# Patient Record
Sex: FEMALE | Race: WHITE | NOT HISPANIC OR LATINO | Employment: OTHER | URBAN - METROPOLITAN AREA
[De-identification: names, ages, dates, MRNs, and addresses within clinical notes are randomized per-mention and may not be internally consistent; named-entity substitution may affect disease eponyms.]

---

## 2017-06-13 NOTE — PATIENT DISCUSSION
06/13/2017[id number]/20&nbsp;SN &nbsp; &nbsp; arh

## 2017-06-13 NOTE — PATIENT DISCUSSION
DRY EYES : Discussed with patient the importance of keeping the eye moist and the symptoms associated with dry eyes including blurry vision, tearing, burning, and prashanth sensation. Advised patient to minimize use of any fans blowing directly on the face. Advised patient to continue with artificial tears 2-3 times daily.

## 2017-06-13 NOTE — PATIENT DISCUSSION
Stopped Today: Pred Forte (prednisolone acetate): drops,suspension: 1% 1 drop four times a day into both eyes 05-

## 2018-05-09 ENCOUNTER — ESTABLISHED COMPREHENSIVE EXAM (OUTPATIENT)
Dept: URBAN - METROPOLITAN AREA CLINIC 39 | Facility: CLINIC | Age: 73
End: 2018-05-09

## 2018-05-09 DIAGNOSIS — H43.813: ICD-10-CM

## 2018-05-09 DIAGNOSIS — H25.813: ICD-10-CM

## 2018-05-09 DIAGNOSIS — H10.45: ICD-10-CM

## 2018-05-09 DIAGNOSIS — H04.123: ICD-10-CM

## 2018-05-09 PROCEDURE — 92015 DETERMINE REFRACTIVE STATE: CPT

## 2018-05-09 PROCEDURE — 92014 COMPRE OPH EXAM EST PT 1/>: CPT

## 2018-05-09 RX ORDER — IBUPROFEN 600 MG/1: 1 TABLET ORAL

## 2018-05-09 ASSESSMENT — VISUAL ACUITY
OS_CC: J2
OS_BAT: 20/100
OS_CC: 20/30
OD_CC: J1
OD_BAT: 20/200
OD_CC: 20/40

## 2018-05-09 ASSESSMENT — TONOMETRY
OS_IOP_MMHG: 12
OD_IOP_MMHG: 14

## 2018-07-31 NOTE — PATIENT DISCUSSION
MYOPIA OU: Educated patient about diagnosis. New spectacle prescription and updated contact lens prescription given to patient. Educated patient about contact lens replacement schedule and not to sleep in contact lenses.

## 2018-07-31 NOTE — PATIENT DISCUSSION
[facility]/2018[id number]&nbsp; &nbsp; &nbsp;

## 2019-08-13 NOTE — PATIENT DISCUSSION
MYOPIA OU: Educated patient about diagnosis. New spectacle prescription and trial contact lens prescription given to patient. Educated patient about contact lens replacement schedule and not to sleep in contact lenses. Patient to return to clinic in 1 week for a contact lens check.

## 2019-08-21 NOTE — PATIENT DISCUSSION
CL CHECK: Finalized CLRx. Educated patient about CL wear time and replacement schedule. Do not sleep in contact lenses.

## 2019-08-21 NOTE — PATIENT DISCUSSION
08/21/2019[facility]-[id number]/20&nbsp;SN &nbsp; &nbsp; bem

## 2020-09-17 ENCOUNTER — ESTABLISHED COMPREHENSIVE EXAM (OUTPATIENT)
Dept: URBAN - METROPOLITAN AREA CLINIC 39 | Facility: CLINIC | Age: 75
End: 2020-09-17

## 2020-09-17 DIAGNOSIS — H43.813: ICD-10-CM

## 2020-09-17 DIAGNOSIS — H10.45: ICD-10-CM

## 2020-09-17 DIAGNOSIS — H04.123: ICD-10-CM

## 2020-09-17 DIAGNOSIS — H25.813: ICD-10-CM

## 2020-09-17 PROCEDURE — 92015 DETERMINE REFRACTIVE STATE: CPT

## 2020-09-17 PROCEDURE — 92014 COMPRE OPH EXAM EST PT 1/>: CPT

## 2020-09-17 RX ORDER — OLOPATADINE HYDROCHLORIDE 2 MG/ML: 1 SOLUTION OPHTHALMIC ONCE A DAY

## 2020-09-17 ASSESSMENT — VISUAL ACUITY
OD_CC: J1
OD_SC: J10
OD_SC: 20/50-1
OD_CC: 20/30-1
OS_SC: J1
OS_CC: 20/40-1
OS_CC: J1
OS_SC: 20/60-2

## 2020-09-17 ASSESSMENT — TONOMETRY
OS_IOP_MMHG: 16
OD_IOP_MMHG: 16

## 2022-07-06 ENCOUNTER — COMPREHENSIVE EXAM (OUTPATIENT)
Dept: URBAN - METROPOLITAN AREA CLINIC 39 | Facility: CLINIC | Age: 77
End: 2022-07-06

## 2022-07-06 DIAGNOSIS — H02.88A: ICD-10-CM

## 2022-07-06 DIAGNOSIS — H02.88B: ICD-10-CM

## 2022-07-06 DIAGNOSIS — H04.123: ICD-10-CM

## 2022-07-06 DIAGNOSIS — H25.813: ICD-10-CM

## 2022-07-06 DIAGNOSIS — H10.45: ICD-10-CM

## 2022-07-06 DIAGNOSIS — H43.813: ICD-10-CM

## 2022-07-06 PROCEDURE — 92014 COMPRE OPH EXAM EST PT 1/>: CPT

## 2022-07-06 PROCEDURE — 92015 DETERMINE REFRACTIVE STATE: CPT

## 2022-07-06 ASSESSMENT — VISUAL ACUITY
OU_SC: 20/70
OU_SC: J1
OS_SC: 20/80
OS_SC: J1
OS_CC: 20/50-2
OU_CC: 20/30-1
OU_CC: J1+
OD_CC: J1
OD_SC: 20/80
OD_SC: J7
OS_CC: J1
OD_CC: 20/30-2

## 2022-07-06 ASSESSMENT — TONOMETRY
OS_IOP_MMHG: 10
OD_IOP_MMHG: 10

## 2022-08-01 ENCOUNTER — CONSULTATION/EVALUATION (OUTPATIENT)
Dept: URBAN - METROPOLITAN AREA CLINIC 39 | Facility: CLINIC | Age: 77
End: 2022-08-01

## 2022-08-01 DIAGNOSIS — H25.813: ICD-10-CM

## 2022-08-01 DIAGNOSIS — H43.813: ICD-10-CM

## 2022-08-01 DIAGNOSIS — H04.123: ICD-10-CM

## 2022-08-01 DIAGNOSIS — H18.513: ICD-10-CM

## 2022-08-01 PROCEDURE — V2799PMN IMPRIMIS PRED-MOXI-NEPAF 5ML

## 2022-08-01 PROCEDURE — 99214 OFFICE O/P EST MOD 30 MIN: CPT

## 2022-08-01 PROCEDURE — 92136TC INTERFEROMETRY - TECHNICAL COMPONENT

## 2022-08-01 PROCEDURE — 92286 ANT SGM IMG I&R SPECLR MIC: CPT

## 2022-08-01 PROCEDURE — 92025-3 CORNEAL TOPO, REFUSED

## 2022-08-01 ASSESSMENT — VISUAL ACUITY
OD_SC: 20/100
OS_AM: 20/20-1
OS_CC: J1
OD_RAM: 20/20-1
OD_CC: 20/40+1
OD_CC: J1
OS_SC: 20/80
OD_PH: 20/30-2
OS_CC: 20/30-2
OD_SC: J7
OS_SC: J1
OD_BAT: 20/200
OS_BAT: 20/100

## 2022-08-01 ASSESSMENT — TONOMETRY
OS_IOP_MMHG: 14
OD_IOP_MMHG: 15

## 2022-09-26 ENCOUNTER — PRE-OP/H&P (OUTPATIENT)
Dept: URBAN - METROPOLITAN AREA CLINIC 39 | Facility: CLINIC | Age: 77
End: 2022-09-26

## 2022-09-26 ENCOUNTER — SURGERY/PROCEDURE (OUTPATIENT)
Dept: URBAN - METROPOLITAN AREA CLINIC 39 | Facility: CLINIC | Age: 77
End: 2022-09-26

## 2022-09-26 DIAGNOSIS — H25.813: ICD-10-CM

## 2022-09-26 PROCEDURE — 99211T TECH SERVICE

## 2022-09-26 PROCEDURE — 66984 XCAPSL CTRC RMVL W/O ECP: CPT

## 2022-09-27 ENCOUNTER — POST-OP (OUTPATIENT)
Dept: URBAN - METROPOLITAN AREA CLINIC 39 | Facility: CLINIC | Age: 77
End: 2022-09-27

## 2022-09-27 DIAGNOSIS — Z96.1: ICD-10-CM

## 2022-09-27 PROCEDURE — 99024 POSTOP FOLLOW-UP VISIT: CPT

## 2022-09-27 ASSESSMENT — VISUAL ACUITY
OD_SC: 20/50-1
OD_PH: 20/30-1
OS_CC: 20/40

## 2022-09-27 ASSESSMENT — TONOMETRY
OS_IOP_MMHG: 15
OD_IOP_MMHG: 15

## 2022-10-07 ENCOUNTER — POST OP/EVAL OF SECOND EYE (OUTPATIENT)
Dept: URBAN - METROPOLITAN AREA CLINIC 39 | Facility: CLINIC | Age: 77
End: 2022-10-07

## 2022-10-07 DIAGNOSIS — H43.813: ICD-10-CM

## 2022-10-07 DIAGNOSIS — H04.123: ICD-10-CM

## 2022-10-07 DIAGNOSIS — H25.812: ICD-10-CM

## 2022-10-07 DIAGNOSIS — H10.45: ICD-10-CM

## 2022-10-07 DIAGNOSIS — Z96.1: ICD-10-CM

## 2022-10-07 DIAGNOSIS — H18.513: ICD-10-CM

## 2022-10-07 DIAGNOSIS — H02.88A: ICD-10-CM

## 2022-10-07 DIAGNOSIS — H02.88B: ICD-10-CM

## 2022-10-07 PROCEDURE — 99024 POSTOP FOLLOW-UP VISIT: CPT

## 2022-10-07 PROCEDURE — 92012 INTRM OPH EXAM EST PATIENT: CPT

## 2022-10-07 ASSESSMENT — TONOMETRY
OD_IOP_MMHG: 13
OS_IOP_MMHG: 14

## 2022-10-07 ASSESSMENT — VISUAL ACUITY
OS_SC: J1
OD_SC: J5
OS_SC: 20/80
OD_SC: 20/25-1
OS_BAT: 20/100

## 2022-10-10 ENCOUNTER — PRE-OP/H&P (OUTPATIENT)
Dept: URBAN - METROPOLITAN AREA SURGERY 14 | Facility: SURGERY | Age: 77
End: 2022-10-10

## 2022-10-10 ENCOUNTER — SURGERY/PROCEDURE (OUTPATIENT)
Dept: URBAN - METROPOLITAN AREA CLINIC 39 | Facility: CLINIC | Age: 77
End: 2022-10-10

## 2022-10-10 DIAGNOSIS — H25.812: ICD-10-CM

## 2022-10-10 PROCEDURE — 66984 XCAPSL CTRC RMVL W/O ECP: CPT

## 2022-10-10 PROCEDURE — 99211T TECH SERVICE

## 2022-10-11 ENCOUNTER — POST-OP (OUTPATIENT)
Dept: URBAN - METROPOLITAN AREA CLINIC 39 | Facility: CLINIC | Age: 77
End: 2022-10-11

## 2022-10-11 DIAGNOSIS — Z96.1: ICD-10-CM

## 2022-10-11 PROCEDURE — 99024 POSTOP FOLLOW-UP VISIT: CPT

## 2022-10-11 ASSESSMENT — TONOMETRY
OD_IOP_MMHG: 12
OS_IOP_MMHG: 14

## 2022-10-11 ASSESSMENT — VISUAL ACUITY
OD_SC: J5
OD_SC: 20/30-2
OS_SC: 20/30-2
OS_SC: J7

## 2022-11-15 ENCOUNTER — POST-OP (OUTPATIENT)
Dept: URBAN - METROPOLITAN AREA CLINIC 39 | Facility: CLINIC | Age: 77
End: 2022-11-15

## 2022-11-15 DIAGNOSIS — Z96.1: ICD-10-CM

## 2022-11-15 PROCEDURE — 99024 POSTOP FOLLOW-UP VISIT: CPT

## 2022-11-15 RX ORDER — LIFITEGRAST 50 MG/ML: 1 SOLUTION/ DROPS OPHTHALMIC TWICE A DAY

## 2022-11-15 ASSESSMENT — TONOMETRY
OD_IOP_MMHG: 14
OS_IOP_MMHG: 14

## 2022-11-15 ASSESSMENT — VISUAL ACUITY
OS_SC: J5
OD_SC: 20/50
OD_SC: J7
OS_SC: 20/20-1

## 2023-01-06 ENCOUNTER — FOLLOW UP (OUTPATIENT)
Dept: URBAN - METROPOLITAN AREA CLINIC 39 | Facility: CLINIC | Age: 78
End: 2023-01-06

## 2023-01-06 DIAGNOSIS — Z96.1: ICD-10-CM

## 2023-01-06 PROCEDURE — 99024 POSTOP FOLLOW-UP VISIT: CPT

## 2023-01-06 RX ORDER — CYCLOSPORINE 0.5 MG/ML: 1 EMULSION OPHTHALMIC TWICE A DAY

## 2023-01-06 ASSESSMENT — TONOMETRY
OD_IOP_MMHG: 8
OS_IOP_MMHG: 10

## 2023-01-06 ASSESSMENT — VISUAL ACUITY
OS_CC: 20/30-1
OD_CC: 20/20-1
OU_CC: J1+
OD_CC: J1+
OU_CC: 20/20-1

## 2023-04-07 ENCOUNTER — ESTABLISHED PATIENT (OUTPATIENT)
Dept: URBAN - METROPOLITAN AREA CLINIC 39 | Facility: CLINIC | Age: 78
End: 2023-04-07

## 2023-04-07 DIAGNOSIS — Z96.1: ICD-10-CM

## 2023-04-07 DIAGNOSIS — H26.493: ICD-10-CM

## 2023-04-07 DIAGNOSIS — H02.88B: ICD-10-CM

## 2023-04-07 DIAGNOSIS — H43.813: ICD-10-CM

## 2023-04-07 DIAGNOSIS — H16.223: ICD-10-CM

## 2023-04-07 DIAGNOSIS — H18.513: ICD-10-CM

## 2023-04-07 DIAGNOSIS — H10.45: ICD-10-CM

## 2023-04-07 DIAGNOSIS — H02.88A: ICD-10-CM

## 2023-04-07 PROCEDURE — 92014 COMPRE OPH EXAM EST PT 1/>: CPT

## 2023-04-07 PROCEDURE — 92015 DETERMINE REFRACTIVE STATE: CPT

## 2023-04-07 RX ORDER — MINERAL OIL 2; 2 MG/.4ML; MG/.4ML: 1 EMULSION OPHTHALMIC

## 2023-04-07 ASSESSMENT — VISUAL ACUITY
OD_CC: J2
OU_SC: J6
OD_SC: J10
OS_SC: J12
OU_SC: 20/30
OU_CC: 20/20
OD_SC: 20/60+2
OU_CC: J1
OS_SC: 20/60
OS_CC: 20/20-1
OD_CC: 20/20-1
OS_CC: J1

## 2023-04-07 ASSESSMENT — TONOMETRY
OD_IOP_MMHG: 13
OS_IOP_MMHG: 14

## 2024-06-05 ENCOUNTER — COMPREHENSIVE EXAM (OUTPATIENT)
Dept: URBAN - METROPOLITAN AREA CLINIC 39 | Facility: CLINIC | Age: 79
End: 2024-06-05

## 2024-06-05 DIAGNOSIS — Z96.1: ICD-10-CM

## 2024-06-05 DIAGNOSIS — H18.513: ICD-10-CM

## 2024-06-05 DIAGNOSIS — H10.45: ICD-10-CM

## 2024-06-05 DIAGNOSIS — H16.223: ICD-10-CM

## 2024-06-05 DIAGNOSIS — H02.88A: ICD-10-CM

## 2024-06-05 DIAGNOSIS — H02.88B: ICD-10-CM

## 2024-06-05 DIAGNOSIS — H26.493: ICD-10-CM

## 2024-06-05 DIAGNOSIS — H43.813: ICD-10-CM

## 2024-06-05 PROCEDURE — 92015 DETERMINE REFRACTIVE STATE: CPT

## 2024-06-05 PROCEDURE — 92014 COMPRE OPH EXAM EST PT 1/>: CPT

## 2024-06-05 ASSESSMENT — VISUAL ACUITY
OD_CC: J1
OS_CC: 20/25+2
OD_CC: 20/25-2
OU_CC: 20/20
OS_CC: J1
OU_CC: J1+

## 2024-06-05 ASSESSMENT — TONOMETRY
OS_IOP_MMHG: 14
OD_IOP_MMHG: 14